# Patient Record
Sex: FEMALE | Race: WHITE | Employment: UNEMPLOYED | URBAN - METROPOLITAN AREA
[De-identification: names, ages, dates, MRNs, and addresses within clinical notes are randomized per-mention and may not be internally consistent; named-entity substitution may affect disease eponyms.]

---

## 2017-11-04 PROBLEM — F44.5 PSEUDOSEIZURE: Status: ACTIVE | Noted: 2017-11-04

## 2021-05-12 ENCOUNTER — APPOINTMENT (OUTPATIENT)
Dept: CT IMAGING | Age: 46
End: 2021-05-12
Payer: COMMERCIAL

## 2021-05-12 ENCOUNTER — APPOINTMENT (OUTPATIENT)
Dept: GENERAL RADIOLOGY | Age: 46
End: 2021-05-12
Payer: COMMERCIAL

## 2021-05-12 ENCOUNTER — HOSPITAL ENCOUNTER (EMERGENCY)
Age: 46
Discharge: HOME | End: 2021-05-12
Attending: EMERGENCY MEDICINE
Payer: COMMERCIAL

## 2021-05-12 VITALS
DIASTOLIC BLOOD PRESSURE: 76 MMHG | OXYGEN SATURATION: 98 % | RESPIRATION RATE: 20 BRPM | BODY MASS INDEX: 20.27 KG/M2 | HEART RATE: 92 BPM | WEIGHT: 126.13 LBS | TEMPERATURE: 97.1 F | HEIGHT: 66 IN | SYSTOLIC BLOOD PRESSURE: 106 MMHG

## 2021-05-12 DIAGNOSIS — S09.90XA MINOR CLOSED HEAD INJURY: ICD-10-CM

## 2021-05-12 DIAGNOSIS — T40.601A OPIATE OVERDOSE, ACCIDENTAL OR UNINTENTIONAL, INITIAL ENCOUNTER (HCC): Primary | ICD-10-CM

## 2021-05-12 PROCEDURE — 99285 EMERGENCY DEPT VISIT HI MDM: CPT

## 2021-05-12 PROCEDURE — 71045 X-RAY EXAM CHEST 1 VIEW: CPT

## 2021-05-12 PROCEDURE — 70450 CT HEAD/BRAIN W/O DYE: CPT

## 2021-05-12 PROCEDURE — 6370000000 HC RX 637 (ALT 250 FOR IP): Performed by: STUDENT IN AN ORGANIZED HEALTH CARE EDUCATION/TRAINING PROGRAM

## 2021-05-12 PROCEDURE — 72125 CT NECK SPINE W/O DYE: CPT

## 2021-05-12 PROCEDURE — 6360000002 HC RX W HCPCS: Performed by: STUDENT IN AN ORGANIZED HEALTH CARE EDUCATION/TRAINING PROGRAM

## 2021-05-12 RX ORDER — LORAZEPAM 2 MG/ML
1 INJECTION INTRAMUSCULAR ONCE
Status: DISCONTINUED | OUTPATIENT
Start: 2021-05-12 | End: 2021-05-12 | Stop reason: HOSPADM

## 2021-05-12 RX ORDER — ACETAMINOPHEN 325 MG/1
650 TABLET ORAL ONCE
Status: COMPLETED | OUTPATIENT
Start: 2021-05-12 | End: 2021-05-12

## 2021-05-12 RX ADMIN — ACETAMINOPHEN 650 MG: 325 TABLET ORAL at 07:34

## 2021-05-12 ASSESSMENT — ENCOUNTER SYMPTOMS
SHORTNESS OF BREATH: 0
DIARRHEA: 0
COUGH: 0
ABDOMINAL PAIN: 0
WHEEZING: 0
BACK PAIN: 0
NAUSEA: 0
VOMITING: 0
SORE THROAT: 0

## 2021-05-12 ASSESSMENT — PAIN SCALES - GENERAL: PAINLEVEL_OUTOF10: 10

## 2021-05-12 NOTE — ED NOTES
The patient sleeps soundly and awakens easily to verbal stimuli. She follows commands. She has no recall of the morning events.      Jon Jiménez RN  05/12/21 6222

## 2021-05-12 NOTE — ED PROVIDER NOTES
This is a 43-year-old female with history of hepatitis C, psychogenic nonepileptic seizures, presenting to the emergency department for apparent opiate overdose. Patient was found by family member in the shower unresponsive, family member did not detect pulse and started chest compressions. When EMS arrived they stated that patient was unresponsive but did have a pulse, was given 2 mg of Narcan and woke up, was agitated. On presentation emergency department patient is restless, agitated, awake and alert. Patient complaining of posterior occipital pain, it is dull, achy, constant, severe, not relieved by anything, not worsened by anything, with no associated symptoms. The history is provided by the patient, the EMS personnel and medical records. Review of Systems   Constitutional: Negative for chills and fever. HENT: Negative for congestion and sore throat. Eyes: Negative for visual disturbance. Respiratory: Negative for cough, shortness of breath and wheezing. Cardiovascular: Negative for chest pain. Gastrointestinal: Negative for abdominal pain, diarrhea, nausea and vomiting. Genitourinary: Negative for dysuria and frequency. Musculoskeletal: Negative for arthralgias, back pain and neck pain. Skin: Negative for rash and wound. Neurological: Positive for headaches. Negative for weakness and light-headedness. Hematological: Negative for adenopathy. All other systems reviewed and are negative. Physical Exam  Vitals signs and nursing note reviewed. Constitutional:       Appearance: She is not ill-appearing or diaphoretic. Comments: Laying in bed, writhing, awake, alert   HENT:      Head:      Comments: Small abrasion left zygomatic process.   Mildly tender to palpation posterior occipital region     Right Ear: External ear normal.      Left Ear: External ear normal.      Nose: Nose normal.      Mouth/Throat:      Mouth: Mucous membranes are moist.      Pharynx: Oropharynx is clear. Comments: Very poor dentition, no bleeding or lacerations  Eyes:      Extraocular Movements: Extraocular movements intact. Pupils: Pupils are equal, round, and reactive to light. Neck:      Musculoskeletal: Normal range of motion and neck supple. No neck rigidity or muscular tenderness. Cardiovascular:      Rate and Rhythm: Normal rate and regular rhythm. Pulses: Normal pulses. Heart sounds: Normal heart sounds. Pulmonary:      Effort: Pulmonary effort is normal. No respiratory distress. Breath sounds: Normal breath sounds. No wheezing. Abdominal:      General: Abdomen is flat. There is no distension. Palpations: Abdomen is soft. Tenderness: There is no abdominal tenderness. Musculoskeletal:         General: No swelling or deformity. Right lower leg: No edema. Left lower leg: No edema. Lymphadenopathy:      Cervical: No cervical adenopathy. Skin:     General: Skin is warm and dry. Capillary Refill: Capillary refill takes less than 2 seconds. Neurological:      General: No focal deficit present. Mental Status: She is alert and oriented to person, place, and time. Sensory: No sensory deficit. Comments: Moving all 4 extremities in bed, sensation intact to light touch throughout all 4 extremities   Psychiatric:         Thought Content: Thought content normal.         Judgment: Judgment normal.          Procedures     MDM  Number of Diagnoses or Management Options  Minor closed head injury  Opiate overdose, accidental or unintentional, initial encounter [facility])  Diagnosis management comments: This is a 70-year-old female with history of opiate use disorder, hepatitis C, presenting to the emergency department after apparent accidental overdose. Patient admits to using heroin, states she just got out of senior living on Friday. Patient denies any IV drug use.   Patient was found unresponsive by family member, was initially given place.    My plan: Symptomatic and supportive care. Will evaluate with CT head and C spine. Patient to be monitored. Electronically signed by Charmaine Torres DO on 5/12/21 at 7:31 AM EDT          [JS]   1130 Reevaluated, she is lying in bed, awake and conversant. Currently talking to social work. [RH]      ED Course User Index  [JS] Charmaine Torres DO  [RH] [address] DO        ED Course as of May 12 1544   Wed May 12, 2021   [id number]   ATTENDING PROVIDER ATTESTATION:     I have personally performed and/or participated in the history, exam, medical decision making, and procedures and agree with all pertinent clinical information unless otherwise noted. I have also reviewed and agree with the past medical, family and social history unless otherwise noted. I have discussed this patient in detail with the resident and provided the instruction and education regarding the evidence-based evaluation and treatment of [unfilled]  History: patient presents with concern for OD. Her daughter found her in the bathroom, unconscious with water everywhere. She had struck her head. EMS gave her narcan with positive response. My findings: Jazmin Jiménez is a 51 y.o. female whom is in mild distress. Physical exam reveals difficulty sitting still. PERRLA, EOMI, abrasion to the left cheek. No midline cervical tenderness. Heart RRR, lungs CTA, abdomen is soft and nontender. No focal neurologic deficits. She is oriented to person and place. My plan: Symptomatic and supportive care. Will evaluate with CT head and C spine. Patient to be monitored. Electronically signed by Charmaine Torres DO on 5/12/21 at 7:31 AM EDT          [JS]   1130 Reevaluated, she is lying in bed, awake and conversant. Currently talking to social work.     [RH]      ED Course User Index  [JS] Charmaine Torres DO  [RH] [address] DO --------------------------------------------- PAST HISTORY ---------------------------------------------  Past Medical History:  has a past medical history of Hepatitis C infection, Seizure ([address] and Ulcer. Past Surgical History:  has no past surgical history on file. Social History:  reports that she has been smoking. She has been smoking about 1.00 pack per day. She has never used smokeless tobacco. She reports current alcohol use. She reports current drug use. Drugs: Cocaine and Marijuana. Family History: family history includes Cancer in her father. The patients home medications have been reviewed. Allergies: Patient has no known allergies. -------------------------------------------------- RESULTS -------------------------------------------------  Labs:  No results found for this visit on 05/12/21. Radiology:  CT HEAD WO CONTRAST   Final Result   No acute intracranial abnormality. CT CERVICAL SPINE WO CONTRAST   Final Result   1. There is no acute compression fracture or subluxation of the cervical spine   2. Straightening and reversal of the normal cervical doses likely secondary   to muscular spasm. 3. Multilevel degenerative disc and degenerative joint disease. XR CHEST PORTABLE   Final Result   No acute process. ------------------------- NURSING NOTES AND VITALS REVIEWED ---------------------------  Date / Time Roomed:  5/12/2021  7:08 AM  ED Bed Assignment:  [id number]    The nursing notes within the ED encounter and vital signs as below have been reviewed.    /76   Pulse 92   Temp 97.1 °F (36.2 °C) (Temporal)   Resp 20   Ht 5' 6\" (1.676 m)   Wt 126 lb 2 oz (57.2 kg)   LMP 04/30/2021   SpO2 98%   BMI 20.36 kg/m²   Oxygen Saturation Interpretation: Normal      ------------------------------------------ PROGRESS NOTES ------------------------------------------  9:23 AM EDT  I have spoken with the patient and discussed todays results, in

## 2021-05-12 NOTE — CARE COORDINATION
Pt here today after being found by family member in the shower unresponsive in shower; apparent drug OD (accidental vs intentional ?). Pt has hx psychogenic nonepileptic seizures. Per EMS pt given 2 mg of Narcan and woke up, was agitated. Pt does have abrasion to the left cheek and R side nose. Pt last seen in ED 11/4/17 for pseudoseizure. LSW consulted to assist w/counseling services for pt. LSW spoke to [facility] and Dr. Adolph Dotson. Pt wants not detox @ this time, is medically cleared and can be d/c'd once she has counseling supports. LSW spoke to [facility] who noted she called pt's Aunt is supposed to pick pt up- her # is [phone]. LSW then spoke to pt. She noted her clothes are wet and Aid is bringing her sweat pants and sweatshirt. LSW talked w/pt about counseling referrals and provided her list. Pt wants to go to [facility]) in [place]. Pt was glad her aunt is coming to get her and somewhat surprised. Pt needs no D&R recovery resources; she has Sina's card and will call if interested. LSW then called [facility] -spoke to Delio kellogg and coordinated assessment appointment for pt 5/18 1100. Pt needs to complete via phone ([facility] to call her) or walk-in and need to notify Hieu Alegre. LSW did review w/pt and she will just complete assessment via call. PT was brought to ED lobby to wait for ride. LSW notified that pt's aunt called and she cannot come and get pt. LSW called aunt and asked what has changed. LSW informed that they now have no car. Not sure why or what has happened. Pt only lives several blocks from hospital. Aunt noted address is [address] [place] [address] Rd. Pt could possibly walk. LSW notes pt can also use transport via insurance. LSW to speak to pt about using insurance for transport and pt cannot be found in lobby. LSW checked outside doors several times and pt could not be found.

## 2021-05-12 NOTE — CARE COORDINATION
This note will not be viewable in [employer] for the following reason(s). Suspected substance abuse disorder. Peer Recovery Support Note    Name: Cyndee Shetty  Date: 5/12/2021    Chief Complaint   Patient presents with   [employer] Fall     to er via ems from home after family member heard a fall in the bathroom and found the pt on the floor unresponsive. ems delivered 2mg narcan and pt responded       Peer Support met with patient. [x] Support and education provided  [x] Resources provided   [] Treatment referral:   [] Other:  [] Patient declined peer recovery services     Referred By: Dr. Saira Sanon    Notes: Patient not agreeable to detox at this time. Patient expressed interest in counseling and/or drug treatment on an outpatient basis. Windy Chan made aware.     Signed: Obinna Bass, 5/12/2021

## 2021-05-12 NOTE — ED NOTES
Abrasions noted to the left cheek and right side nose.  No bleeding     Vern Watts RN  05/12/21 0796

## 2021-05-12 NOTE — ED NOTES
Niece Elenore Bloch) called and left telephone #.  Wants called upon d/c so she can pick her up [address] [place] [address]  05/12/21 8519

## 2021-06-06 ENCOUNTER — HOSPITAL ENCOUNTER (EMERGENCY)
Age: 46
Discharge: LEFT BEFORE DISCHARGE | End: 2021-06-06
Attending: EMERGENCY MEDICINE
Payer: COMMERCIAL

## 2021-06-06 VITALS
RESPIRATION RATE: 18 BRPM | DIASTOLIC BLOOD PRESSURE: 88 MMHG | TEMPERATURE: 98.3 F | OXYGEN SATURATION: 79 % | SYSTOLIC BLOOD PRESSURE: 143 MMHG | HEART RATE: 111 BPM

## 2021-06-06 DIAGNOSIS — F19.90 ILLICIT DRUG USE: Primary | ICD-10-CM

## 2021-06-06 DIAGNOSIS — F14.90 COCAINE USE: ICD-10-CM

## 2021-06-06 PROCEDURE — 99284 EMERGENCY DEPT VISIT MOD MDM: CPT

## 2021-06-06 ASSESSMENT — ENCOUNTER SYMPTOMS
COUGH: 0
ABDOMINAL DISTENTION: 0
BACK PAIN: 0
NAUSEA: 0
EYE REDNESS: 0
SINUS PRESSURE: 0
EYE DISCHARGE: 0
VOMITING: 0
SHORTNESS OF BREATH: 0
DIARRHEA: 0
EYE PAIN: 0
SORE THROAT: 0
WHEEZING: 0

## 2021-06-06 NOTE — ED PROVIDER NOTES
HPI   53-year-old female presents emerged department as a drug overdose. She states she does not crack cocaine is alert and orient x3 at this time. She was found by EMS to be face down on the pavement when they woke her up she was very agitated and noted to have abrasions to her face. She has no complaints of any pain at this time. She states she would like to leave at this time and is trying to find a ride to come pick her up at this time. She denies any chest pain abdominal pain changes to bowel or bladder habits, fevers chills cough notes Raynaud's denies any shortness of breath and chest pain. The patient presents with overdose that has been going on for 1 day. These symptoms are moderate in severity. Symptoms are made better by nothinng. Symptoms are made worse by nothing. Associated symptoms include none . Review of Systems   Constitutional: Negative for chills and fever. HENT: Negative for ear pain, sinus pressure and sore throat. Eyes: Negative for pain, discharge and redness. Respiratory: Negative for cough, shortness of breath and wheezing. Cardiovascular: Negative for chest pain. Gastrointestinal: Negative for abdominal distention, diarrhea, nausea and vomiting. Genitourinary: Negative for dysuria and frequency. Musculoskeletal: Negative for arthralgias and back pain. Skin: Negative for rash and wound. Neurological: Negative for weakness and headaches. Hematological: Negative for adenopathy. Psychiatric/Behavioral: Positive for agitation. All other systems reviewed and are negative. Physical Exam  Vitals and nursing note reviewed. Constitutional:       General: She is not in acute distress. Appearance: Normal appearance. She is normal weight. She is not toxic-appearing. HENT:      Head: Normocephalic. Comments: Abrasions patient patient states that she will  Eyes:      General: No scleral icterus.      Conjunctiva/sclera: Conjunctivae normal. the emergency department stage I to leave. She would be allowed to leave coldly with a sober  due to her intoxication. She was able to find a sober  here in the emergency department that sugar back to her home. She did sign out [address] at this time. ED Course as of Jun 06 1512   Sun Jun 06, 2021   1304 ATTENDING PROVIDER ATTESTATION:     I have personally performed and/or participated in the history, exam, medical decision making, and procedures and agree with all pertinent clinical information unless otherwise noted. I have also reviewed and agree with the past medical, family and social history unless otherwise noted. I have discussed this patient in detail with the resident and provided the instruction and education regarding the evidence-based evaluation and treatment of fall. History: This patient reports that she was smoking crack today when she lost her balance while stumbling and fell forward striking her face. EMS and police were summoned. Patient states she chose to come here to the hospital rather than go to intermediate. She denies any injury. She is alert, she is oriented. She is able to answer questions appropriately. My findings: Braydon Yates is a 51 y.o. female whom is in no distress. Physical exam reveals she is alert and oriented. Heart is regular and mildly tachycardic. Lungs are coarse bilaterally. Abdomen is scaphoid soft nontender peer extremities are intact no edema. Patient is alert and oriented and answers questions correctly. She seems able to make informed decision. She states that she does not want to have any examination nor testing done here today. I discussed with her her desire to scan her face to evaluate for fracture. Also to scan her head to evaluate for bleed. She refuses adamantly stating that she can call somebody come pick her up. I did discuss with her the risks involved. She does seem to understand.     My plan: Symptomatic and supportive care. She is refusing treatment and will await for a ride home. Electronically signed by Derik Avalos DO on 6/6/21 at 1:04 PM EDT          [TG]   1332 Patient declines any medical treatment and does not want evaluated, she wants to leave is try to find a ride to come and pick her up so she can safely leave the emergency department. Alert and oriented x3.    [CB]      ED Course User Index  [CB] Larna Sacks, MD  [TG] Derik Avalos DO      ED Course as of Jun 06 1512   Sun Jun 06, 2021   1304 ATTENDING PROVIDER ATTESTATION:     I have personally performed and/or participated in the history, exam, medical decision making, and procedures and agree with all pertinent clinical information unless otherwise noted. I have also reviewed and agree with the past medical, family and social history unless otherwise noted. I have discussed this patient in detail with the resident and provided the instruction and education regarding the evidence-based evaluation and treatment of fall. History: This patient reports that she was smoking crack today when she lost her balance while stumbling and fell forward striking her face. EMS and police were summoned. Patient states she chose to come here to the hospital rather than go to custodial. She denies any injury. She is alert, she is oriented. She is able to answer questions appropriately. My findings: Aditya Maldonado is a 51 y.o. female whom is in no distress. Physical exam reveals she is alert and oriented. Heart is regular and mildly tachycardic. Lungs are coarse bilaterally. Abdomen is scaphoid soft nontender peer extremities are intact no edema. Patient is alert and oriented and answers questions correctly. She seems able to make informed decision. She states that she does not want to have any examination nor testing done here today. I discussed with her her desire to scan her face to evaluate for fracture.   Also to scan her head to evaluate for bleed. She refuses adamantly stating that she can call somebody come pick her up. I did discuss with her the risks involved. She does seem to understand. My plan: Symptomatic and supportive care. She is refusing treatment and will await for a ride home. Electronically signed by Bethany Serrano DO on 6/6/21 at 1:04 PM EDT          [TG]   1332 Patient declines any medical treatment and does not want evaluated, she wants to leave is try to find a ride to come and pick her up so she can safely leave the emergency department. Alert and oriented x3.    [CB]      ED Course User Index  [CB] Kali Jacinto MD  [TG] Bethany Serrano DO       --------------------------------------------- PAST HISTORY ---------------------------------------------  Past Medical History:  has a past medical history of Hepatitis C infection, Seizure ([facility] [address] and Ulcer. Past Surgical History:  has no past surgical history on file. Social History:  reports that she has been smoking. She has been smoking about 1.00 pack per day. She has never used smokeless tobacco. She reports current alcohol use. She reports current drug use. Drugs: Cocaine and Marijuana. Family History: family history includes Cancer in her father. The patients home medications have been reviewed. Allergies: Patient has no known allergies. -------------------------------------------------- RESULTS -------------------------------------------------  Labs:  No results found for this visit on 06/06/21. Radiology:  CT HEAD WO CONTRAST    Result Date: 5/12/2021  EXAMINATION: CT OF THE HEAD WITHOUT CONTRAST  5/12/2021 8:33 am TECHNIQUE: CT of the head was performed without the administration of intravenous contrast. Dose modulation, iterative reconstruction, and/or weight based adjustment of the mA/kV was utilized to reduce the radiation dose to as low as reasonably achievable. COMPARISON: None.  HISTORY: ORDERING SYSTEM PROVIDED HISTORY: Occipital head pain, fall with head strike. Left zygomatic process abrasion. TECHNOLOGIST PROVIDED HISTORY: Reason for exam:->Occipital head pain, fall with head strike. Left zygomatic process abrasion. Has a \"code stroke\" or \"stroke alert\" been called? ->No Decision Support Exception - unselect if not a suspected or confirmed emergency medical condition->Emergency Medical Condition (MA) FINDINGS: BRAIN/VENTRICLES: There is no acute intracranial hemorrhage, mass effect or midline shift. No abnormal extra-axial fluid collection. The gray-white differentiation is maintained without evidence of an acute infarct. There is no evidence of hydrocephalus. ORBITS: The visualized portion of the orbits demonstrate no acute abnormality. SINUSES: The visualized paranasal sinuses and mastoid air cells demonstrate no acute abnormality. SOFT TISSUES/SKULL:  No acute abnormality of the visualized skull or soft tissues. No acute intracranial abnormality. CT CERVICAL SPINE WO CONTRAST    Result Date: 5/12/2021  EXAMINATION: CT OF THE CERVICAL SPINE WITHOUT CONTRAST 5/12/2021 8:33 am TECHNIQUE: CT of the cervical spine was performed without the administration of intravenous contrast. Multiplanar reformatted images are provided for review. Dose modulation, iterative reconstruction, and/or weight based adjustment of the mA/kV was utilized to reduce the radiation dose to as low as reasonably achievable. COMPARISON: None. HISTORY: ORDERING SYSTEM PROVIDED HISTORY: Fall, overdose, concern for fracture TECHNOLOGIST PROVIDED HISTORY: Reason for exam:->Fall, overdose, concern for fracture Decision Support Exception - unselect if not a suspected or confirmed emergency medical condition->Emergency Medical Condition (MA) FINDINGS: The ring of C1 is intact as is the dense. There is no compression fracture of the cervical spine. No jumped or perched facet is noted. There is straightening and reversal of the normal cervical lordosis. No jumped or perched facet is noted. Multilevel degenerative disc and degenerative joint disease is noted. The degenerative disc disease is most prominent at the C5-6 level. The prevertebral soft tissues are unremarkable. The airway is widely patent. Images through the lung apices are negative for a pneumothorax. 1. There is no acute compression fracture or subluxation of the cervical spine 2. Straightening and reversal of the normal cervical doses likely secondary to muscular spasm. 3. Multilevel degenerative disc and degenerative joint disease. XR CHEST PORTABLE    Result Date: 5/12/2021  EXAMINATION: ONE XRAY VIEW OF THE CHEST 5/12/2021 7:22 am COMPARISON: None. HISTORY: ORDERING SYSTEM PROVIDED HISTORY: overdose with chest compressions, evaluate for rib fracture TECHNOLOGIST PROVIDED HISTORY: Reason for exam:->overdose with chest compressions, evaluate for rib fracture FINDINGS: The lungs are without acute focal process. There is no effusion or pneumothorax. The cardiomediastinal silhouette is without acute process. The osseous structures are without acute process. No acute process. ------------------------- NURSING NOTES AND VITALS REVIEWED ---------------------------  Date / Time Roomed:  6/6/2021 12:42 PM  ED Bed Assignment:  [id number]    The nursing notes within the ED encounter and vital signs as below have been reviewed. BP (!) 143/88   Pulse 111   Temp 98.3 °F (36.8 °C) (Oral)   Resp 18   SpO2 (!) 79%   Oxygen Saturation Interpretation: Normal      ------------------------------------------ PROGRESS NOTES ------------------------------------------  Time: 1400  Re-evaluation. Patients symptoms show no change  Repeat physical examination is not changed. I have spoken with the patient and discussed todays availabe results to this point, in addition to providing specific details for the plan of care and counseling regarding the diagnosis and prognosis.   Their questions are answered, however they are not agreeable to admission.     --------------------------------- ADDITIONAL PROVIDER NOTES ---------------------------------  This patient has chosen to leave against medical advice. I have personally explained to them that choosing to do so may result in permanent bodily harm or death. I discussed at length that without further evaluation and monitoring there may be unforeseen circumstances and deterioration resulting in permanent bodily harm or death as a result of their choice. They are alert, oriented, and competent at this time. They state that they are aware of the serious risks as explained, but they continue to wish to leave against medical   advice. In light of their decision to leave against medical advice, follow-up has been arranged and they are aware of the importance of following up as instructed. They have been advised that they should return to the ED immediately if they change their mind at any time, or if their condition begins to change or worsen. The follow up plan has been discussed in detail and they are aware of the specific conditions for emergent return, as well as the importance of follow-up. Discharge Medication List as of 6/6/2021  2:25 PM          Diagnosis:  1. Illicit drug use    2. Cocaine use        Disposition:  Patient's disposition: Left against medical advice. Patient's condition is stable.        Duc Crane MD  Resident  06/06/21 7431

## 2021-06-06 NOTE — ED NOTES
Bed: 05  Expected date:   Expected time:   Means of arrival:   Comments:  [address], RN  06/06/21 8898

## 2021-06-06 NOTE — ED NOTES
Patient refusing all tests/ procedures at this time, + abrasions noted to bridge of nose and forehead, patient is alert and crying at this time, knows she is in hospital , who she is and time. [id number], patient states she did not want to come in to hospital to begin with. Explained that she can not leave unless she has a sober ride, patient states she does not have a ride nor her phone/ wallet with her. Physician aware, patient admits to crack and heroin. Awaiting further. Patient took herself off the monitor and refuses the oxygen at this time.       Urmila Trevizo RN  06/06/21 [address]  06/06/21 1346

## 2021-06-06 NOTE — ED NOTES
Spoke to Alexandra Liu from [facility], is aware patient is not wanting any type of help nor resources at this time, states she wants to leave.       Tika Garcia RN  06/06/21 8816

## 2021-06-06 NOTE — ED NOTES
Pt placed on 4L NC.  Pt is 96% on 4L NC     Deonte Mccall RN  06/06/21 6064 [facility] Madelaine Huertas RN  06/06/21 0449

## 2021-07-03 ENCOUNTER — HOSPITAL ENCOUNTER (EMERGENCY)
Age: 46
Discharge: HOME | End: 2021-07-03
Attending: EMERGENCY MEDICINE
Payer: COMMERCIAL

## 2021-07-03 VITALS
DIASTOLIC BLOOD PRESSURE: 78 MMHG | OXYGEN SATURATION: 97 % | HEART RATE: 78 BPM | RESPIRATION RATE: 12 BRPM | WEIGHT: 100 LBS | TEMPERATURE: 98.3 F | SYSTOLIC BLOOD PRESSURE: 114 MMHG | BODY MASS INDEX: 16.14 KG/M2

## 2021-07-03 DIAGNOSIS — F19.10 POLYSUBSTANCE ABUSE (HCC): Primary | ICD-10-CM

## 2021-07-03 PROCEDURE — 99283 EMERGENCY DEPT VISIT LOW MDM: CPT

## 2021-07-03 ASSESSMENT — ENCOUNTER SYMPTOMS
EYE DISCHARGE: 0
BACK PAIN: 0
COUGH: 0
SORE THROAT: 0
SHORTNESS OF BREATH: 0
DIARRHEA: 0
SINUS PRESSURE: 0
EYE PAIN: 0
VOMITING: 0
WHEEZING: 0
NAUSEA: 0
ABDOMINAL DISTENTION: 0
EYE REDNESS: 0

## 2021-07-03 NOTE — ED PROVIDER NOTES
Chief Complaint   Patient presents with    Drug Overdose     per [facility], states she was found \"stumbling\" out of restroom, patient admits to doing crack cocaine and heroin. patient answers questions appropriately at this time. alert and oriented x 4, patient denies use of narcan. Patient is a 52-year female presents today for polysubstance drug abuse. States she did use heroin last night. She was found stumbling around outside of a restaurant by local PD today, who brought her into the ER for evaluation. On arrival patient is alert oriented x3, no acute distress. Has no complaints at this time. Does admit using heroin last night, did not use anything today. She did not require any Narcan or reversal medication for her symptoms. She has remained alert and oriented today. She has no complaints. The history is provided by the patient. No  was used. Review of Systems   Constitutional: Negative for chills and fever. HENT: Negative for ear pain, sinus pressure and sore throat. Eyes: Negative for pain, discharge and redness. Respiratory: Negative for cough, shortness of breath and wheezing. Cardiovascular: Negative for chest pain. Gastrointestinal: Negative for abdominal distention, diarrhea, nausea and vomiting. Genitourinary: Negative for dysuria and frequency. Musculoskeletal: Negative for arthralgias and back pain. Skin: Negative for rash and wound. Neurological: Negative for dizziness, syncope, weakness and headaches. Hematological: Negative for adenopathy. Psychiatric/Behavioral: Negative for behavioral problems and confusion. All other systems reviewed and are negative. Physical Exam  Vitals and nursing note reviewed. Constitutional:       General: She is not in acute distress. Appearance: Normal appearance. She is well-developed. She is not ill-appearing, toxic-appearing or diaphoretic. HENT:      Head: Normocephalic and atraumatic. Eyes:      Pupils: Pupils are equal, round, and reactive to light. Cardiovascular:      Rate and Rhythm: Normal rate and regular rhythm. Pulses: Normal pulses. Heart sounds: Normal heart sounds. Pulmonary:      Effort: Pulmonary effort is normal. No respiratory distress. Breath sounds: Normal breath sounds. No wheezing or rales. Abdominal:      General: Bowel sounds are normal.      Palpations: Abdomen is soft. Tenderness: There is no abdominal tenderness. There is no guarding or rebound. Musculoskeletal:      Cervical back: Normal range of motion and neck supple. Skin:     General: Skin is warm and dry. Capillary Refill: Capillary refill takes less than 2 seconds. Findings: Lesion present. Comments: Multiple track marks in the upper and lower extremities bilaterally   Neurological:      General: No focal deficit present. Mental Status: She is alert and oriented to person, place, and time. Cranial Nerves: No cranial nerve deficit. Coordination: Coordination normal.      Comments: Muscle strength 5/5 in upper and lower extremities bilaterally  Sensation intact to light touch in upper and lower extremities bilaterally  Alert and oriented x3     Psychiatric:         Mood and Affect: Mood normal.          Procedures     Labs Reviewed - No data to display  No orders to display       MDM  Number of Diagnoses or Management Options  Polysubstance abuse [facility])  Diagnosis management comments: Patient is a 52-year female presents today for polysubstance drug abuse. She is a police hold. Patient did use heroin last night, has not used any other recreational drugs. Did not require any Narcan. On arrival in the department vitals are stable, she has 97% on room air. She is alert and oriented x3, no acute distress. Has no thoughts wanting to hurt herself or anyone else.   With patient remaining stable, not requiring any Narcan, having no complaints she will be discharged to local . ED Course as of Jul 03 1041   Sat Jul 03, 2021   [address]:     I have personally performed and/or participated in the history, exam, medical decision making, and procedures and agree with all pertinent clinical information unless otherwise noted. I have also reviewed and agree with the past medical, family and social history unless otherwise noted. I have discussed this patient in detail with the resident, and provided the instruction and education regarding patient here brought in by the police as a police hold for evaluation of drug use. Patient did not require any reversal agents. Patient states that she is here because the please brought her in. She has no physical complaints, she denies chest pain, palpitations or shortness of breath. She is not suicidal.  She admits to doing drugs. She has no physical complaints. She states she is ready to go. .  My findings/plan: Patient mildly fidgeting and hyperactive but otherwise in no acute distress. Heart rate regular, lungs are clear and equal.  No sign of acute head or face injury. Arms legs are neurovascular intact, moving all well with good and equal strength. No sign of acute bone or joint injury. Abdomen nontender. No jaundice or icterus. No focal neurologic deficit. Oriented x3. [NC]      ED Course User Index  [NC] Quentin Doran, DO       --------------------------------------------- PAST HISTORY ---------------------------------------------  Past Medical History:  has a past medical history of Hepatitis C infection, Seizure ([facility] [address] and Ulcer. Past Surgical History:  has no past surgical history on file. Social History:  reports that she has been smoking. She has been smoking about 1.00 pack per day. She has never used smokeless tobacco. She reports current alcohol use. She reports current drug use. Drugs: Cocaine and Marijuana.     Family History: family history includes Cancer in her father. The patients home medications have been reviewed. Allergies: Patient has no known allergies. -------------------------------------------------- RESULTS -------------------------------------------------  Labs:  No results found for this visit on 07/03/21. Radiology:  No orders to display       ------------------------- NURSING NOTES AND VITALS REVIEWED ---------------------------  Date / Time Roomed:  7/3/2021  9:57 AM  ED Bed Assignment:  08/08    The nursing notes within the ED encounter and vital signs as below have been reviewed. /78   Pulse 78   Temp 98.3 °F (36.8 °C) (Oral)   Resp 12   Wt 100 lb (45.4 kg)   SpO2 97%   BMI 16.14 kg/m²   Oxygen Saturation Interpretation: Normal      ------------------------------------------ PROGRESS NOTES ------------------------------------------  I have spoken with the patient and discussed todays results, in addition to providing specific details for the plan of care and counseling regarding the diagnosis and prognosis. Their questions are answered at this time and they are agreeable with the plan. I discussed at length with them reasons for immediate return here for re evaluation. They will followup with primary care by calling their office tomorrow. --------------------------------- ADDITIONAL PROVIDER NOTES ---------------------------------  At this time the patient is without objective evidence of an acute process requiring hospitalization or inpatient management. They have remained hemodynamically stable throughout their entire ED visit and are stable for discharge with outpatient follow-up. The plan has been discussed in detail and they are aware of the specific conditions for emergent return, as well as the importance of follow-up. New Prescriptions    No medications on file       Diagnosis:  1.  Polysubstance abuse ([facility] [address]        Disposition:  Patient's disposition: Discharge to home  Patient's condition is stable.             Pepper Garcia DO  Resident  07/03/21 8680

## 2023-11-05 PROBLEM — G43.909 MIGRAINES: Status: ACTIVE | Noted: 2023-11-05

## 2023-11-05 PROBLEM — G43.111 INTRACTABLE MIGRAINE WITH AURA WITH STATUS MIGRAINOSUS: Status: ACTIVE | Noted: 2023-11-05

## 2023-11-05 PROBLEM — H81.10 BPV (BENIGN POSITIONAL VERTIGO): Status: ACTIVE | Noted: 2023-11-05

## 2023-11-05 RX ORDER — RIZATRIPTAN BENZOATE 5 MG/1
TABLET ORAL
COMMUNITY
Start: 2022-01-14 | End: 2024-05-13 | Stop reason: SDUPTHER

## 2023-11-05 RX ORDER — BENZONATATE 100 MG/1
1 CAPSULE ORAL 3 TIMES DAILY PRN
COMMUNITY
Start: 2022-10-20 | End: 2023-11-06

## 2023-11-05 RX ORDER — FLUTICASONE PROPIONATE 50 MCG
1 SPRAY, SUSPENSION (ML) NASAL DAILY
COMMUNITY
Start: 2022-10-20 | End: 2023-11-06

## 2023-11-05 RX ORDER — BENZONATATE 200 MG/1
200 CAPSULE ORAL 3 TIMES DAILY PRN
COMMUNITY
Start: 2022-09-02 | End: 2023-11-06

## 2023-11-05 RX ORDER — CYCLOBENZAPRINE HCL 10 MG
10 TABLET ORAL EVERY 8 HOURS
COMMUNITY
Start: 2019-12-18 | End: 2023-11-06

## 2023-11-05 RX ORDER — ALBUTEROL SULFATE 90 UG/1
AEROSOL, METERED RESPIRATORY (INHALATION)
COMMUNITY
Start: 2022-09-02 | End: 2023-11-06

## 2023-11-06 ENCOUNTER — PROCEDURE VISIT (OUTPATIENT)
Dept: NEUROLOGY | Facility: CLINIC | Age: 48
End: 2023-11-06
Payer: COMMERCIAL

## 2023-11-06 DIAGNOSIS — G43.711 CHRONIC MIGRAINE WITHOUT AURA, WITH INTRACTABLE MIGRAINE, SO STATED, WITH STATUS MIGRAINOSUS: Primary | ICD-10-CM

## 2023-11-06 PROCEDURE — 64615 CHEMODENERV MUSC MIGRAINE: CPT | Performed by: STUDENT IN AN ORGANIZED HEALTH CARE EDUCATION/TRAINING PROGRAM

## 2023-11-06 ASSESSMENT — ENCOUNTER SYMPTOMS
LOSS OF SENSATION IN FEET: 0
DEPRESSION: 0
OCCASIONAL FEELINGS OF UNSTEADINESS: 0

## 2023-11-06 ASSESSMENT — PATIENT HEALTH QUESTIONNAIRE - PHQ9
1. LITTLE INTEREST OR PLEASURE IN DOING THINGS: NOT AT ALL
2. FEELING DOWN, DEPRESSED OR HOPELESS: NOT AT ALL
SUM OF ALL RESPONSES TO PHQ9 QUESTIONS 1 AND 2: 0

## 2023-11-06 NOTE — PROGRESS NOTES
Neurology Botulinum Injection    Subjective   Mar Torres is an 48 y.o. female here for medical botulinum injection for Chronic migraine without aura, with intractable migraine, so stated, with status migrainosus [G43.711].     States that headaches headaches are down to 5/30 HA days per month. Rizatriptan resolves breakthrough headaches. Denies any side effects to either medication and would like to continue current treatment plan.      Head/Face/Jaw Botulinum Injection    Date/Time: 11/6/2023 9:59 AM    Performed by: Soren Fish DO  Authorized by: Soren Fish DO      Consent:      Consent obtained:  Written     Consent given by:  Patient    Procedure details:      Toxin (Brand):  OnaBoNT-A (Botox)       Total units injected:  0     Total units wasted:  0    Comments: Procedure Note: PREEMPT Botox    Indications: Chronic Migraine    Informed consent was obtained (explaining the procedure and risks and benefits of procedure) from patient: the signed consent form was placed in the medical record.  A time out was completed, verifying correct patient, procedure,site, positioning, and implants or special equipment.    200 units of OnabotulinumtoxinA were reconstituted with saline. Sites of injection were identified via PREEMPT protocol and cleaned with alcohol pads. Using a 30 gauge needle, patient received following injections:    5 units in procerus  5 units in each left and right   10 units divided between 2 sites of L frontalis  10 units divided between 2 sites of R frontalis  20 units divided between 4 sites of L temporalis  20 units divided between 4 sites of R temporalis  15 units divided between 3 sites of L occipitalis  15 units divided between 3 sites of R occipitalis  10 units divided between 2 sites of L paracervical muscles  10 units divided between 2 sites of R paracervical muscles  15 units divided between 3 sites of L trapezius  15 units divided between 3 sites of R  trapezius    Additional Sites:  10 units divided between 2 sites of L temporalis  10 units divided between 2 sites of R temporalis    Used: 175u  Wasted: 25u    There were no complications. Patient was comfortable and left without complaint.     OnabotulinumtoxinA  Lot #: [id number]  Exp: 2/2026

## 2023-12-13 ENCOUNTER — APPOINTMENT (OUTPATIENT)
Dept: OBSTETRICS AND GYNECOLOGY | Facility: CLINIC | Age: 48
End: 2023-12-13
Payer: COMMERCIAL

## 2023-12-15 ENCOUNTER — APPOINTMENT (OUTPATIENT)
Dept: OBSTETRICS AND GYNECOLOGY | Facility: CLINIC | Age: 48
End: 2023-12-15
Payer: COMMERCIAL

## 2023-12-22 ENCOUNTER — OFFICE VISIT (OUTPATIENT)
Dept: OBGYN CLINIC | Age: 48
End: 2023-12-22
Payer: COMMERCIAL

## 2023-12-22 VITALS — HEART RATE: 59 BPM | DIASTOLIC BLOOD PRESSURE: 70 MMHG | SYSTOLIC BLOOD PRESSURE: 102 MMHG | WEIGHT: 167 LBS

## 2023-12-22 DIAGNOSIS — Z12.31 SCREENING MAMMOGRAM FOR BREAST CANCER: ICD-10-CM

## 2023-12-22 DIAGNOSIS — Z72.51 UNPROTECTED SEXUAL INTERCOURSE: ICD-10-CM

## 2023-12-22 DIAGNOSIS — Z11.51 SCREENING FOR HUMAN PAPILLOMAVIRUS: ICD-10-CM

## 2023-12-22 DIAGNOSIS — Z01.419 WOMEN'S ANNUAL ROUTINE GYNECOLOGICAL EXAMINATION: Primary | ICD-10-CM

## 2023-12-22 PROCEDURE — 99386 PREV VISIT NEW AGE 40-64: CPT | Performed by: ADVANCED PRACTICE MIDWIFE

## 2023-12-22 PROCEDURE — G8484 FLU IMMUNIZE NO ADMIN: HCPCS | Performed by: ADVANCED PRACTICE MIDWIFE

## 2023-12-23 LAB
CLUE CELLS VAG QL WET PREP: NORMAL
T VAGINALIS VAG QL WET PREP: NORMAL
TRICHOMONAS VAGINALIS SCREEN: NEGATIVE
YEAST VAG QL WET PREP: NORMAL

## 2023-12-26 NOTE — PROGRESS NOTES
Chief Complaint:     Malika Howell is a 51 y.o. female who presents here today for complaints of:      Chief Complaint   Patient presents with    New Patient    Annual Exam    Vaginal Bleeding     Bleeding during and after intercourse       History of Present Illness:     Malika Howell is a 51 y.o. female who presents for her annual exam.    Concerns Today:    None, doing well    Past Medical History: Allergies:  Penicillins  No LMP recorded. Obstetrical History:  No obstetric history on file. History reviewed. No pertinent past medical history. Medications:     Current Outpatient Medications on File Prior to Visit   Medication Sig Dispense Refill    Hydroquinone 1.5 % CREA APPLY TOPICALLY TWICE DAILY (Patient not taking: Reported on 12/22/2023) 1 Tube 0     No current facility-administered medications on file prior to visit. Review of Systems:     Review of Systems   Constitutional:  Negative for activity change, appetite change, chills, diaphoresis, fatigue, fever and unexpected weight change. HENT:  Negative for congestion, postnasal drip, rhinorrhea, sneezing, sore throat, trouble swallowing and voice change. Respiratory:  Negative for cough and shortness of breath. Cardiovascular:  Negative for chest pain. Gastrointestinal:  Negative for abdominal pain, constipation, diarrhea, nausea and vomiting. Genitourinary:  Negative for difficulty urinating, dyspareunia, dysuria, frequency, genital sores, menstrual problem, pelvic pain, vaginal bleeding, vaginal discharge and vaginal pain. Musculoskeletal:  Negative for arthralgias and myalgias. Neurological:  Negative for dizziness, syncope and headaches. Hematological:  Negative for adenopathy. All other systems reviewed and are negative. Physical Exam:     Vitals:  /70   Pulse 59   Wt 75.8 kg (167 lb)     Prior Pap History:  No prior pap results    Physical Exam  Constitutional:       General: She is not in acute distress.

## 2023-12-28 ENCOUNTER — TELEPHONE (OUTPATIENT)
Dept: OBGYN CLINIC | Age: 48
End: 2023-12-28

## 2023-12-28 NOTE — TELEPHONE ENCOUNTER
Left msg through interpretive services to call office and schedule referral to PCP to establish care.

## 2023-12-30 LAB
HPV HR 12 DNA SPEC QL NAA+PROBE: NOT DETECTED
HPV16 DNA SPEC QL NAA+PROBE: NOT DETECTED
HPV16+18+H RISK 12 DNA SPEC-IMP: NORMAL
HPV18 DNA SPEC QL NAA+PROBE: NOT DETECTED

## 2024-02-05 ENCOUNTER — APPOINTMENT (OUTPATIENT)
Dept: NEUROLOGY | Facility: CLINIC | Age: 49
End: 2024-02-05
Payer: MEDICARE

## 2024-02-12 ENCOUNTER — PROCEDURE VISIT (OUTPATIENT)
Dept: NEUROLOGY | Facility: CLINIC | Age: 49
End: 2024-02-12
Payer: MEDICARE

## 2024-02-12 DIAGNOSIS — G43.709 CHRONIC MIGRAINE W/O AURA W/O STATUS MIGRAINOSUS, NOT INTRACTABLE: Primary | ICD-10-CM

## 2024-02-12 PROCEDURE — 64615 CHEMODENERV MUSC MIGRAINE: CPT | Performed by: STUDENT IN AN ORGANIZED HEALTH CARE EDUCATION/TRAINING PROGRAM

## 2024-02-12 NOTE — PROGRESS NOTES
Neurology Botulinum Injection    Subjective   Mar Torres is an 48 y.o. female here for medical botulinum injection for Chronic migraine w/o aura w/o status migrainosus, not intractable [G43.709].     Since last Botox, having 2/30 HA days per month. Denies any side effects to Botox. Rizatriptan effective for breakthrough headaches. Would like to proceed with Botox today.    Head/Face/Jaw Botulinum Injection    Date/Time: 2/12/2024 3:22 PM    Performed by: Soren Fish DO  Authorized by: Soren Fish DO      Procedure details:        Total units injected:  0     Total units wasted:  0    Comments: Procedure Note: PREEMPT Botox    Indications: Chronic Migraine    Informed consent was obtained (explaining the procedure and risks and benefits of procedure) from patient: the signed consent form was placed in the medical record.  A time out was completed, verifying correct patient, procedure,site, positioning, and implants or special equipment.    200 units of OnabotulinumtoxinA were reconstituted with saline. Sites of injection were identified via PREEMPT protocol and cleaned with alcohol pads. Using a 30 gauge needle, patient received following injections:    5 units in procerus  5 units in each left and right   10 units divided between 2 sites of L frontalis  10 units divided between 2 sites of R frontalis  20 units divided between 4 sites of L temporalis  20 units divided between 4 sites of R temporalis  15 units divided between 3 sites of L occipitalis  15 units divided between 3 sites of R occipitalis  10 units divided between 2 sites of L paracervical muscles  10 units divided between 2 sites of R paracervical muscles  15 units divided between 3 sites of L trapezius  15 units divided between 3 sites of R trapezius    Additional Sites:  10u L temporalis  10u R temporalis    Used: 175u  Wasted: 25u    There were no complications. Patient was comfortable and left without complaint.      OnabotulinumtoxinA  Lot #: [id number]  Exp: 6/2026

## 2024-05-06 ENCOUNTER — APPOINTMENT (OUTPATIENT)
Dept: NEUROLOGY | Facility: CLINIC | Age: 49
End: 2024-05-06
Payer: MEDICARE

## 2024-05-13 ENCOUNTER — PROCEDURE VISIT (OUTPATIENT)
Dept: NEUROLOGY | Facility: CLINIC | Age: 49
End: 2024-05-13
Payer: MEDICARE

## 2024-05-13 DIAGNOSIS — G43.709 CHRONIC MIGRAINE WITHOUT AURA WITHOUT STATUS MIGRAINOSUS, NOT INTRACTABLE: Primary | ICD-10-CM

## 2024-05-13 PROCEDURE — 64615 CHEMODENERV MUSC MIGRAINE: CPT | Performed by: STUDENT IN AN ORGANIZED HEALTH CARE EDUCATION/TRAINING PROGRAM

## 2024-05-13 RX ORDER — RIZATRIPTAN BENZOATE 5 MG/1
TABLET ORAL
Qty: 9 TABLET | Refills: 11 | Status: SHIPPED | OUTPATIENT
Start: 2024-05-13

## 2024-05-13 RX ORDER — AZITHROMYCIN 250 MG/1
TABLET, FILM COATED ORAL
COMMUNITY
Start: 2024-03-19

## 2024-05-13 NOTE — PROGRESS NOTES
Neurology Botulinum Injection    Subjective   Mar Torres is an 49 y.o. female here for medical botulinum injection for Chronic migraine without aura without status migrainosus, not intractable [G43.709].     Since last Botox, having 3/30 HA days per month. Denies any side effects to Botox. Rizatriptan effective for breakthrough headaches. Will continue Botox today.     Botox    Date/Time: 5/13/2024 2:33 PM    Performed by: Soren Fish DO  Authorized by: Soren Fish DO    Procedure specific details:      Procedure Note: PREEMPT Botox    Indications: Chronic Migraine    Informed consent was obtained (explaining the procedure and risks and benefits of procedure) from patient: the signed consent form was placed in the medical record.  A time out was completed, verifying correct patient, procedure,site, positioning, and implants or special equipment.    200 units of OnabotulinumtoxinA were reconstituted with saline. Sites of injection were identified via PREEMPT protocol and cleaned with alcohol pads. Using a 30 gauge needle, patient received following injections:    5 units in procerus  5 units in each left and right   10 units divided between 2 sites of L frontalis  10 units divided between 2 sites of R frontalis  20 units divided between 4 sites of L temporalis  20 units divided between 4 sites of R temporalis  15 units divided between 3 sites of L occipitalis  15 units divided between 3 sites of R occipitalis  10 units divided between 2 sites of L paracervical muscles  10 units divided between 2 sites of R paracervical muscles  15 units divided between 3 sites of L trapezius  15 units divided between 3 sites of R trapezius    Additional Sites:  10u L temporalis  10u R temporalis    Used: 175u  Wasted: 25u    There were no complications. Patient was comfortable and left without complaint.     OnabotulinumtoxinA  Lot #: [id number]  Exp: 5/2026

## 2024-08-19 ENCOUNTER — APPOINTMENT (OUTPATIENT)
Dept: NEUROLOGY | Facility: CLINIC | Age: 49
End: 2024-08-19
Payer: MEDICARE

## 2024-08-19 DIAGNOSIS — G43.709 CHRONIC MIGRAINE WITHOUT AURA WITHOUT STATUS MIGRAINOSUS, NOT INTRACTABLE: Primary | ICD-10-CM

## 2024-08-19 NOTE — PROGRESS NOTES
Neurology Botulinum Injection    Subjective   Mar Torres is an 49 y.o. female here for medical botulinum injection for Chronic migraine without aura without status migrainosus, not intractable [G43.709].     Since last Botox, having 2/30 HA days per month. Denies any side effects to Botox. Would like to proceed with Botox today.     Botox    Date/Time: 8/19/2024 2:28 PM    Performed by: Soren Fish DO  Authorized by: Soren Fish DO    Procedure specific details:      Procedure Note: PREEMPT Botox    Indications: Chronic Migraine    Informed consent was obtained (explaining the procedure and risks and benefits of procedure) from patient: the signed consent form was placed in the medical record.  A time out was completed, verifying correct patient, procedure,site, positioning, and implants or special equipment.    200 units of OnabotulinumtoxinA were reconstituted with saline. Sites of injection were identified via PREEMPT protocol and cleaned with alcohol pads. Using a 30 gauge needle, patient received following injections:    5 units in procerus  5 units in each left and right   10 units divided between 2 sites of L frontalis  10 units divided between 2 sites of R frontalis  20 units divided between 4 sites of L temporalis  20 units divided between 4 sites of R temporalis  15 units divided between 3 sites of L occipitalis  15 units divided between 3 sites of R occipitalis  10 units divided between 2 sites of L paracervical muscles  10 units divided between 2 sites of R paracervical muscles  15 units divided between 3 sites of L trapezius  15 units divided between 3 sites of R trapezius    Additional Sites:  10u L temporalis  10u R temporalis    Used: 175u  Wasted: 25u    There were no complications. Patient was comfortable and left without complaint.     OnabotulinumtoxinA  Lot #: [id number]  Exp: 11/2026

## 2024-11-25 ENCOUNTER — APPOINTMENT (OUTPATIENT)
Dept: NEUROLOGY | Facility: CLINIC | Age: 49
End: 2024-11-25
Payer: MEDICARE

## 2024-11-25 DIAGNOSIS — G43.709 CHRONIC MIGRAINE WITHOUT AURA WITHOUT STATUS MIGRAINOSUS, NOT INTRACTABLE: Primary | ICD-10-CM

## 2024-11-25 PROCEDURE — 96372 THER/PROPH/DIAG INJ SC/IM: CPT | Performed by: STUDENT IN AN ORGANIZED HEALTH CARE EDUCATION/TRAINING PROGRAM

## 2024-11-25 PROCEDURE — 64615 CHEMODENERV MUSC MIGRAINE: CPT | Performed by: STUDENT IN AN ORGANIZED HEALTH CARE EDUCATION/TRAINING PROGRAM

## 2024-11-25 PROCEDURE — 2500000004 HC RX 250 GENERAL PHARMACY W/ HCPCS (ALT 636 FOR OP/ED): Mod: JW | Performed by: STUDENT IN AN ORGANIZED HEALTH CARE EDUCATION/TRAINING PROGRAM

## 2024-11-25 NOTE — PROGRESS NOTES
Neurology Botulinum Injection    Subjective   Mar Torres is an 49 y.o. female here for medical botulinum injection for Chronic migraine without aura without status migrainosus, not intractable [G43.709].     Since last Botox, having 8/30 HA days per month. Denies any side effects to Botox.     Botox    Date/Time: 11/25/2024 2:40 PM    Performed by: Soren Fish DO  Authorized by: Soren Fish DO    Procedure specific details:      Procedure Note: PREEMPT Botox    Indications: Chronic Migraine    Informed consent was obtained (explaining the procedure and risks and benefits of procedure) from patient: the signed consent form was placed in the medical record.  A time out was completed, verifying correct patient, procedure,site, positioning, and implants or special equipment.    200 units of OnabotulinumtoxinA were reconstituted with saline. Sites of injection were identified via PREEMPT protocol and cleaned with alcohol pads. Using a 30 gauge needle, patient received following injections:    5 units in procerus  5 units in each left and right   10 units divided between 2 sites of L frontalis  10 units divided between 2 sites of R frontalis  20 units divided between 4 sites of L temporalis  20 units divided between 4 sites of R temporalis  15 units divided between 3 sites of L occipitalis  15 units divided between 3 sites of R occipitalis  10 units divided between 2 sites of L paracervical muscles  10 units divided between 2 sites of R paracervical muscles  15 units divided between 3 sites of L trapezius  15 units divided between 3 sites of R trapezius    Additional Sites:  10u L temporalis  10u R temporalis    Used: 175u  Wasted: 25u    There were no complications. Patient was comfortable and left without complaint.     OnabotulinumtoxinA  Lot #: [id number]  Exp: 4/2027

## 2025-02-24 ENCOUNTER — APPOINTMENT (OUTPATIENT)
Dept: NEUROLOGY | Facility: CLINIC | Age: 50
End: 2025-02-24
Payer: MEDICARE

## 2025-03-14 ENCOUNTER — OFFICE VISIT (OUTPATIENT)
Dept: PRIMARY CARE | Facility: CLINIC | Age: 50
End: 2025-03-14
Payer: MEDICARE

## 2025-03-14 VITALS
WEIGHT: 158.8 LBS | DIASTOLIC BLOOD PRESSURE: 80 MMHG | SYSTOLIC BLOOD PRESSURE: 110 MMHG | RESPIRATION RATE: 20 BRPM | BODY MASS INDEX: 29.04 KG/M2 | TEMPERATURE: 98.8 F

## 2025-03-14 DIAGNOSIS — J32.9 SINOBRONCHITIS: Primary | ICD-10-CM

## 2025-03-14 DIAGNOSIS — J40 SINOBRONCHITIS: Primary | ICD-10-CM

## 2025-03-14 DIAGNOSIS — J02.9 SORE THROAT: ICD-10-CM

## 2025-03-14 LAB
POC RAPID INFLUENZA A: NEGATIVE
POC RAPID INFLUENZA B: NEGATIVE
POC RAPID STREP: NEGATIVE
POC SARS-COV-2 AG BINAX: NORMAL

## 2025-03-14 PROCEDURE — 87811 SARS-COV-2 COVID19 W/OPTIC: CPT | Performed by: NURSE PRACTITIONER

## 2025-03-14 PROCEDURE — 99213 OFFICE O/P EST LOW 20 MIN: CPT | Performed by: NURSE PRACTITIONER

## 2025-03-14 PROCEDURE — 87804 INFLUENZA ASSAY W/OPTIC: CPT | Performed by: NURSE PRACTITIONER

## 2025-03-14 PROCEDURE — 87880 STREP A ASSAY W/OPTIC: CPT | Performed by: NURSE PRACTITIONER

## 2025-03-14 RX ORDER — AZITHROMYCIN 250 MG/1
TABLET, FILM COATED ORAL
Qty: 6 TABLET | Refills: 0 | Status: SHIPPED | OUTPATIENT
Start: 2025-03-14 | End: 2025-03-19

## 2025-03-14 ASSESSMENT — ENCOUNTER SYMPTOMS
SLEEP DISTURBANCE: 1
COUGH: 1
CONSTIPATION: 0
HOARSE VOICE: 1
ACTIVITY CHANGE: 0
NAUSEA: 0
APPETITE CHANGE: 0
CHILLS: 1
VOMITING: 0
HEADACHES: 1
FEVER: 0
RHINORRHEA: 1
DIARRHEA: 0
SORE THROAT: 1
SINUS PAIN: 1

## 2025-03-14 NOTE — PROGRESS NOTES
Subjective   Patient ID: Mar Torres is a 50 y.o. female who presents for Sore Throat (Cough runny nose).    Cold symptoms x3 days  Sore throat; hurts to swallow  Cough  Headache  Hoarseness  Nasal congestion  Nasal drainage    Has been sick x1 month; never getting better    OTC- [place]/ Theraflu    Sore Throat   This is a new problem. Episode onset: 3 days. The problem has been gradually worsening. Neither side of throat is experiencing more pain than the other. There has been no fever. The pain is at a severity of 8/10. Associated symptoms include congestion, coughing, headaches and a hoarse voice. Pertinent negatives include no diarrhea, ear pain or vomiting. Associated symptoms comments: Sinus pressure  Hurts to swallow  Runny nose. Treatments tried: theraflu, halls. The treatment provided mild relief.        Review of Systems   Constitutional:  Positive for chills. Negative for activity change, appetite change and fever.   HENT:  Positive for congestion, hoarse voice, postnasal drip, rhinorrhea, sinus pain and sore throat. Negative for ear pain.    Respiratory:  Positive for cough.    Gastrointestinal:  Negative for constipation, diarrhea, nausea and vomiting.   Neurological:  Positive for headaches.   Psychiatric/Behavioral:  Positive for sleep disturbance.        Objective   /80   Temp 37.1 °C (98.8 °F) (Tympanic)   Resp 20   Wt 72 kg (158 lb 12.8 oz)   BMI 29.04 kg/m²     Physical Exam  Vitals reviewed.   Constitutional:       General: She is not in acute distress.     Appearance: Normal appearance. She is not ill-appearing or toxic-appearing.   HENT:      Head: Normocephalic.      Right Ear: Tympanic membrane, ear canal and external ear normal.      Left Ear: Tympanic membrane, ear canal and external ear normal.      Nose: Mucosal edema and rhinorrhea present. Rhinorrhea is clear.      Right Turbinates: Swollen.      Left Turbinates: Swollen.      Mouth/Throat:      Lips: Pink.      Mouth: Mucous  membranes are moist.      Pharynx: Posterior oropharyngeal erythema and postnasal drip present.   Eyes:      Extraocular Movements: Extraocular movements intact.      Conjunctiva/sclera: Conjunctivae normal.      Pupils: Pupils are equal, round, and reactive to light.   Cardiovascular:      Rate and Rhythm: Normal rate and regular rhythm.      Pulses: Normal pulses.      Heart sounds: Normal heart sounds.   Pulmonary:      Effort: Pulmonary effort is normal.      Breath sounds: Normal breath sounds.   Musculoskeletal:      Cervical back: Normal range of motion and neck supple.   Skin:     General: Skin is warm and dry.      Capillary Refill: Capillary refill takes less than 2 seconds.   Neurological:      General: No focal deficit present.      Mental Status: She is alert and oriented to person, place, and time.   Psychiatric:         Mood and Affect: Mood normal.         Behavior: Behavior normal.         Assessment/Plan   Diagnoses and all orders for this visit:  Sinobronchitis  -     azithromycin (Zithromax) 250 mg tablet; Take 2 tablets (500 mg) by mouth once daily for 1 day, THEN 1 tablet (250 mg) once daily for 4 days. Take 2 tabs (500 mg) by mouth today, than 1 daily for 4 days..  Sore throat  -     POCT BinaxNOW Covid-19 Ag Card manually resulted  -     POCT Influenza A/B manually resulted  -     POCT rapid strep A manually resulted  -     Group A Streptococcus, PCR  -     Sars-CoV-2 and Influenza A/B PCR    IO Strep/Flu/Covid negative; PCR Swabs to be sent. Will follow up on results as needed  Antibiotics started for acute sinobronchitis. Take full course until completed  Encouraged daily use of Flonase and Zyrtec for symptom support  Increase hydration  Follow up with PCP if not improving over the next 2-3 days  ER for any SOB, difficulty breathing, uncontrolled fevers or worsening of symptoms

## 2025-03-15 LAB
FLUAV RNA RESP QL NAA+PROBE: NOT DETECTED
FLUBV RNA RESP QL NAA+PROBE: NOT DETECTED
S PYO DNA THROAT QL NAA+PROBE: NOT DETECTED
SARS-COV-2 RNA RESP QL NAA+PROBE: NOT DETECTED

## 2025-06-02 ENCOUNTER — APPOINTMENT (OUTPATIENT)
Dept: NEUROLOGY | Facility: CLINIC | Age: 50
End: 2025-06-02
Payer: MEDICARE

## 2025-06-26 DIAGNOSIS — G43.709 CHRONIC MIGRAINE WITHOUT AURA WITHOUT STATUS MIGRAINOSUS, NOT INTRACTABLE: ICD-10-CM

## 2025-06-26 RX ORDER — RIZATRIPTAN BENZOATE 5 MG/1
TABLET ORAL
Qty: 9 TABLET | Refills: 11 | OUTPATIENT
Start: 2025-06-26

## 2025-07-14 ENCOUNTER — TELEPHONE (OUTPATIENT)
Dept: PHYSICAL THERAPY | Facility: CLINIC | Age: 50
End: 2025-07-14
Payer: MEDICARE

## 2025-07-16 NOTE — PROGRESS NOTES
"  Physical Therapy  Physical Therapy Evaluation    Patient Name: Mar Torres  MRN: [id number]  Today's Date: 7/18/2025  Time Calculation  Start Time: 0202  Stop Time: 0241  Time Calculation (min): 39 min  PT Evaluation Time Entry  PT Evaluation (Low) Time Entry: 29  PT Therapeutic Procedures Time Entry  Manual Therapy Time Entry: 10                   Insurance:    Visit number: 1 of 12  Authorization info: 12V 7/2/25-8/22/25  Insurance Type: [facility]     General:  Reason for visit: R shoulder/neck pain  Referred by: Sam Fernandez MD    Current Problem:  S13.4XXA- Cervical Strain   S46.911A- R shoulder Strain     Precautions: None     Medical History Form: Reviewed (scanned into chart)    Subjective:   Pt reports that she was vacuuming at work and the vacuum got stuck when she was pushing it and felt pain in shoulder. Pt stated that she gets pain that wakes her up at night and has been using biofreeze/icy hot on her shoulder at night to get some relief while she is sleeping. She noted that she will sometimes get woken up at night from the pain and have to reapply and then it takes her a while to get back to sleep. She noted that she had drTin appointment yesterday and he told her to go see PT and to check back in with her in a month. Pt has hx of migraines and is taking medication for those but the accident has flared up HA more. Pt reports that she has had some changes in vision since accident. She reports having N/T into 4-5th digits, especially at night. Pt notes that she is still working full time but supposed to be on light duty but her job is \"unorganized\" and sometimes she will have to lift heavy things and she feels pain in shoulder.     Pt notes that she was vacuuming and pushed too hard on vacuum because one of the wheels got stuck and when she pushes she feels pain/nerve stretch. - First report of injury 6/25/25    Chief Complaint: Patient presents to clinic R shoulder/ neck pain  Onset Date: 6/20/25  YARON: " "Trauma- vacuuming at work and over extended arm/pulled     Current Condition:   Same    Pain:  Pain Assessment: 0-10  0-10 (Numeric) Pain Score: 5 - Moderate pain  Pain Type: Acute pain  Pain Location: Shoulder  Pain Orientation: Right  Pain Radiating Towards: down to R elbow  Pain Descriptors: Sharp, Aching  Pain Frequency: Constant/continuous  Clinical Progression: Not changed  Location: R shoulder - pt pointed to posterior shoulder UT region  Description: Can be sharp when using it but achy the rest of the time   Aggravating Factors:  Reaching Overhead and Carrying  Relieving Factors:  Icy hot & biofreeze for temporary pain relief at night and pain medication   Wakes her up at night and puts more topical cream on and then it takes a while for her to go back to sleep.     Relevant Information (PMH & Previous Tests/Imaging): None   Previous Interventions/Treatments: None    Prior Level of Function (PLOF)  Patient previously independent with all ADLs  Exercise/Physical Activity: Walking   Work/School: Still working full time- supposed to be on light work but sometimes has to still lift heavy things.   Hobbies: play/carry with grandkids (unable to currently due to pain)      Patients Living Environment: Reviewed and no concern    Primary Language: English    Patient's Goal(s) for Therapy: \"Go back to doing normal things\"     Red Flags: Do you have any of the following? Yes- chills and Night pain and N/T in RUE   Fever/chills, unexplained weight changes, dizziness/fainting, unexplained change in bowel or bladder functions, unexplained malaise or muscle weakness, night pain/sweats, numbness or tingling    Objective:    Cervical AROM ( P! indicates pain)  Flex: 20 P!  Ext: 22  RSB: 30 P!  LSB: 22 P!  RR: 20 P!  LR: 22 P!    Cervical MMT: NT due to pain   Flex:   Ext:   RSB:   LSB:   RR:   LR:     Cervical Special Tests:   Spurlings: (+)  Compression: (+)  Distraction: increased pain    Shoulder AROM ( P! indicates " pain)-  Flexion:  R 82 P!  ABD: R 78 P!  ER: R 27 P!  IR: R HBB P!    Shoulder MMT  ( P! indicates pain)-  Flex: R 3- P!  ABD: R 3-  IR: R NT due to pain  ER: R NT due to pain      Special Tests - NT due to pain   Pedro Urias  Carlos   Empty Can   Painful Arc  Speeds  Biceps Load      Palpation: TTP all around shoulder and UT    Posture: Slight rounded shoulders and guarding noted on R side.     Outcome Measures:         Complexity Low     EDUCATION: Pt educated in HEP, PT POC, anatomy, activity avoidance, proper positioning/posture, use of Heat , pt demonstrates understanding of PT info, all questions answered.      Goals: Set and discussed today  Increase AROM to WNL of R filomena  Increase Strength by 1/3 to 1 mm grade where deficits noted of R filomena  Ind w/ HEP of R filomena  Return to PLOF of R filomena  Reduce c/o Pain to 2/10 or less of R filomena  Pt's functional goals being able to play with and  kids and return to full duty at work without pain.     Plan of care was developed with input and agreement by the patient.    Treatment Performed:    Therapeutic Exercise:    0 min  Shoulder shrugs P!  Scap retracts P!  Filomena ISO ABD P!      Manual Therapy:    10 min  Light distraction and UT stretching and very light STM to cervical paraspinals and along UT and over anterior/lateral R shoulder   PROM into shoulder flexion, ABD and ER within pain free range       Assessment: 51 y/o F presents with c/o R shoulder . Upon examination patient demonstrates moderate pain limiting overall functional mobility including turning head and lifting shoulder. Activity limitations and participations restrictions include being able to complete duties at work and caring for kids/grandkids. Pt to benefit from outpatient PT to address deficits, maximize functional mobility and improve QOL.  The clinical presentation of this patient is stable and their history and examination findings are consistent with a low complexity evaluation with  good rehab potential.  Shoulder shrugs and scapular retractions were trialed and caused pain so it was discontinued. Pt was unable to do shoulder ABD isometrics as well due to pain. Pt was able to tolerate manual therapy done today fairly with minimal discomfort when in pain free range.           Plan:     Planned Interventions include: therapeutic exercise, self-care home management, manual therapy, therapeutic activities, gait training, neuromuscular coordination, vasopneumatic, dry needling, aquatic therapy  Frequency: 2x/wk  Duration: 6 weeks

## 2025-07-18 ENCOUNTER — EVALUATION (OUTPATIENT)
Dept: PHYSICAL THERAPY | Facility: CLINIC | Age: 50
End: 2025-07-18
Payer: COMMERCIAL

## 2025-07-18 DIAGNOSIS — S13.4XXD SPRAIN OF LIGAMENTS OF CERVICAL SPINE, SUBSEQUENT ENCOUNTER: ICD-10-CM

## 2025-07-18 DIAGNOSIS — S46.911D RIGHT SHOULDER STRAIN, SUBSEQUENT ENCOUNTER: Primary | ICD-10-CM

## 2025-07-18 PROBLEM — S13.4XXA SPRAIN OF LIGAMENTS OF CERVICAL SPINE: Status: ACTIVE | Noted: 2025-07-18

## 2025-07-18 PROCEDURE — 97140 MANUAL THERAPY 1/> REGIONS: CPT | Mod: GP | Performed by: PHYSICAL THERAPIST

## 2025-07-18 PROCEDURE — 97161 PT EVAL LOW COMPLEX 20 MIN: CPT | Mod: GP | Performed by: PHYSICAL THERAPIST

## 2025-07-18 ASSESSMENT — PAIN SCALES - GENERAL: PAINLEVEL_OUTOF10: 5 - MODERATE PAIN

## 2025-07-18 ASSESSMENT — PAIN - FUNCTIONAL ASSESSMENT: PAIN_FUNCTIONAL_ASSESSMENT: 0-10

## 2025-07-18 ASSESSMENT — PAIN DESCRIPTION - DESCRIPTORS: DESCRIPTORS: SHARP;ACHING

## 2025-07-22 ENCOUNTER — TREATMENT (OUTPATIENT)
Dept: PHYSICAL THERAPY | Facility: CLINIC | Age: 50
End: 2025-07-22
Payer: COMMERCIAL

## 2025-07-22 DIAGNOSIS — S46.911D RIGHT SHOULDER STRAIN, SUBSEQUENT ENCOUNTER: Primary | ICD-10-CM

## 2025-07-22 DIAGNOSIS — S13.4XXD SPRAIN OF LIGAMENTS OF CERVICAL SPINE, SUBSEQUENT ENCOUNTER: ICD-10-CM

## 2025-07-22 PROCEDURE — 97140 MANUAL THERAPY 1/> REGIONS: CPT | Mod: GP,CQ

## 2025-07-22 PROCEDURE — 97110 THERAPEUTIC EXERCISES: CPT | Mod: GP,CQ

## 2025-07-22 ASSESSMENT — PAIN SCALES - GENERAL: PAINLEVEL_OUTOF10: 5 - MODERATE PAIN

## 2025-07-22 ASSESSMENT — PAIN DESCRIPTION - DESCRIPTORS: DESCRIPTORS: SHARP;ACHING

## 2025-07-22 ASSESSMENT — PAIN - FUNCTIONAL ASSESSMENT: PAIN_FUNCTIONAL_ASSESSMENT: 0-10

## 2025-07-22 NOTE — PROGRESS NOTES
"Physical Therapy Treatment    Patient Name: Mar Torres  MRN: [id number]  Today's Date: 7/22/2025  Time Calculation  Start Time: 0230  Stop Time: 0310  Time Calculation (min): 40 min     PT Therapeutic Procedures Time Entry  Therapeutic Exercise Time Entry: 23  Manual Therapy Time Entry: 17                 Insurance:   Visit number: 2 of 12  Authorization info: 12V 7/2/25-8/22/25  Insurance Type: [facility]      General:  Reason for visit: R shoulder/neck pain  Referred by: Sam Fernandez MD  Pt reports that she was vacuuming at work and the vacuum got stuck when she was pushing it and felt pain in shoulder.     Assessment:   Pt very guarded w/ ex's and manual performed today. She had the most pain w/ end range flex performed today. Some tightness and mod tenderness along R UT however pt did report decreased discomfort following STM to area. Progressed program w/ addition of seated table slides, UT Stretches and Levator Stretches. Pt w/ very poor posture during session. Mod cues to correct w/ fair f/t. Pt w/ fair tolerance overall to given ex's. Encouraged cont'd compliance w/ HEP.     Plan:       Current Problem  1. Right shoulder strain, subsequent encounter        2. Sprain of ligaments of cervical spine, subsequent encounter            Subjective   General   \"It's a little better.\" 5/10 currently   Precautions   None    Pain  Pain Assessment: 0-10  0-10 (Numeric) Pain Score: 5 - Moderate pain  Pain Type: Acute pain  Pain Location: Shoulder  Pain Orientation: Right, Upper  Pain Radiating Towards: down to R elbow  Pain Descriptors: Sharp, Aching  Pain Frequency: Constant/continuous  Clinical Progression: Not changed    Objective       Treatments:  Therapeutic Exercise (35359):   Shoulder shrugs P!  Scap retracts 3\" x 10   Breann ISO ABD P!  Supine Shoulder CP, ER x10 ea  Seated Table Slides Flex, Scap x10 ea  UT Stretch 30\" x 3 R only  Levator Stretch 30\" x 3 R only       Manual ([id number]): 17 min   Light distraction and UT " stretching and very light STM to cervical paraspinals and along UT and over anterior/lateral R shoulder   PROM into shoulder flexion, ABD and ER within pain free range   STM R UT   EDUCATION:

## 2025-07-24 NOTE — PROGRESS NOTES
"Physical Therapy Treatment    Patient Name: Mar Torres  MRN: [id number]  Today's Date: 7/29/2025  Time Calculation  Start Time: 0254  Stop Time: 0319  Time Calculation (min): 25 min     PT Therapeutic Procedures Time Entry  Therapeutic Exercise Time Entry: 17  Manual Therapy Time Entry: 8                 Insurance:   Visit number: 3 of 12  Authorization info: 12V 7/2/25-8/22/25  Insurance Type: [facility]      General:  Reason for visit: R shoulder/neck pain  Referred by: Sam Fernandez MD  Pt reports that she was vacuuming at work and the vacuum got stuck when she was pushing it and felt pain in shoulder.        Current Problem  1. Right shoulder strain, subsequent encounter        2. Sprain of ligaments of cervical spine              Subjective   General   Pt notes pain still subsiding and is back to work on light duty.  Precautions   None    Pain  Pain Assessment: 0-10  0-10 (Numeric) Pain Score: 4  Pain Type: Acute pain  Pain Location: Shoulder  Clinical Progression: Gradually improving    Objective   Arrived late today    Treatments:  Therapeutic Exercise (97269):  Shoulder shrugs  20x  Scap retracts 20x   Cervical ROM F/E/RR/LR/RSB/LSB 10x ea  UT Stretch 30\" x 3 BL  Supine Wand FF 20x  Levator Stretch 30\" x 3 BL  Breann ISO ABD --  Supine Shoulder CP, ER x20 ea  Seated Table Slides Flex, Scap x10 ea--         Manual ([id number]):    Light distraction and UT stretching and very light STM to cervical paraspinals and along UT and over anterior/lateral R shoulder   PROM into shoulder flexion, ABD and ER within pain free range   STM R UT     EDUCATION:     Assessment:  Pt able to tolerate tx session well this date w/ no adverse effects noted from tx.  Pt compliant w/ therapy and appears to be making gains w/ program  Still requires skilled therapy  to address functional and measurable deficits that impair pt's QOL.     Plan:  D/t noted impairments and dysfunction of  R breann and neck, PT will cont to address deficits of strength " and ROM via therapeutic exs and modalities PRN and further assist w/ pain reduction.

## 2025-07-29 ENCOUNTER — TREATMENT (OUTPATIENT)
Dept: PHYSICAL THERAPY | Facility: CLINIC | Age: 50
End: 2025-07-29
Payer: COMMERCIAL

## 2025-07-29 DIAGNOSIS — S13.4XXA SPRAIN OF LIGAMENTS OF CERVICAL SPINE: ICD-10-CM

## 2025-07-29 DIAGNOSIS — S46.911D RIGHT SHOULDER STRAIN, SUBSEQUENT ENCOUNTER: Primary | ICD-10-CM

## 2025-07-29 PROCEDURE — 97110 THERAPEUTIC EXERCISES: CPT | Mod: GP | Performed by: PHYSICAL THERAPIST

## 2025-07-29 PROCEDURE — 97140 MANUAL THERAPY 1/> REGIONS: CPT | Mod: GP | Performed by: PHYSICAL THERAPIST

## 2025-07-29 ASSESSMENT — PAIN - FUNCTIONAL ASSESSMENT: PAIN_FUNCTIONAL_ASSESSMENT: 0-10

## 2025-07-29 ASSESSMENT — PAIN SCALES - GENERAL: PAINLEVEL_OUTOF10: 4

## 2025-07-29 NOTE — PROGRESS NOTES
"Physical Therapy Treatment    Patient Name: Mar Torres  MRN: [id number]  Today's Date: 7/31/2025  Time Calculation  Start Time: 0245  Stop Time: 0324  Time Calculation (min): 39 min     PT Therapeutic Procedures Time Entry  Therapeutic Exercise Time Entry: 28  Manual Therapy Time Entry: 11                 Insurance:   Visit number: 4 of 12  Authorization info: 12V 7/2/25-8/22/25    Insurance Type: [facility]      General:  Reason for visit: R shoulder/neck pain  Referred by: Sam Fernandez MD  Pt reports that she was vacuuming at work and the vacuum got stuck when she was pushing it and felt pain in shoulder.        Current Problem  No diagnosis found.        Subjective   General   Pt notes pain still present.  Precautions   None    Pain  Pain Assessment: 0-10  0-10 (Numeric) Pain Score: 4  Pain Type: Acute pain  Pain Location: Shoulder    Objective       Treatments:  Therapeutic Exercise (01333):  Shoulder shrugs  20x  Cervical LSB Stretch 5x30\"  Scap retracts 10x10\"  Cervical ROM F/E/RR/LR/RSB/LSB 10x ea--  UT Stretch 30\" x 3 BL  Supine Wand FF/AB 20x ea  Levator Stretch 30\" x 3 BL  Breann ISO ABD --  Supine Shoulder CP, ER x20 ea  Seated Table Slides Flex, Scap x10 ea--  LAE RTB 20x  Pulley Rows RTB 20x  Pallof Press RTB 20 BL  UBE 3' F       Manual ([id number]):    Light distraction and UT stretching and very light STM to cervical paraspinals and along UT and over anterior/lateral R shoulder   PROM into shoulder flexion, ABD and ER within pain free range   STM R UT     EDUCATION:     Assessment:  No pain behavior noted during tx.  Pt able to tolerate tx session well this date w/ no adverse effects noted from tx.  Pt compliant w/ therapy and appears to be making gains w/ program  Still requires skilled therapy  to address functional and measurable deficits that impair pt's QOL.     Plan:  D/t noted impairments and dysfunction of  R breann and neck, PT will cont to address deficits of strength and ROM via therapeutic exs and " modalities PRN and further assist w/ pain reduction.

## 2025-07-31 ENCOUNTER — TREATMENT (OUTPATIENT)
Dept: PHYSICAL THERAPY | Facility: CLINIC | Age: 50
End: 2025-07-31
Payer: COMMERCIAL

## 2025-07-31 DIAGNOSIS — S46.911D RIGHT SHOULDER STRAIN, SUBSEQUENT ENCOUNTER: Primary | ICD-10-CM

## 2025-07-31 PROCEDURE — 97110 THERAPEUTIC EXERCISES: CPT | Mod: GP | Performed by: PHYSICAL THERAPIST

## 2025-07-31 PROCEDURE — 97140 MANUAL THERAPY 1/> REGIONS: CPT | Mod: GP | Performed by: PHYSICAL THERAPIST

## 2025-07-31 ASSESSMENT — PAIN SCALES - GENERAL: PAINLEVEL_OUTOF10: 4

## 2025-07-31 ASSESSMENT — PAIN - FUNCTIONAL ASSESSMENT: PAIN_FUNCTIONAL_ASSESSMENT: 0-10

## 2025-08-01 NOTE — PROGRESS NOTES
"Physical Therapy Treatment    Patient Name: Mar Torres  MRN: [id number]  Today's Date: 8/5/2025  Time Calculation  Start Time: 0255  Stop Time: 0319  Time Calculation (min): 24 min     PT Therapeutic Procedures Time Entry  Therapeutic Exercise Time Entry: 24                 Insurance:   Visit number: 5 of 12  Authorization info: 12V 7/2/25-8/22/25    Insurance Type: [facility]      General:  Reason for visit: R shoulder/neck pain  Referred by: Sam Fernandez MD  Pt reports that she was vacuuming at work and the vacuum got stuck when she was pushing it and felt pain in shoulder.        Current Problem  1. Right shoulder strain, subsequent encounter                Subjective   General   Pt notes no pain this date and things are getting better..  Precautions   None    Pain  Pain Assessment: 0-10  0-10 (Numeric) Pain Score: 0 - No pain  Pain Location: Shoulder  Pain Orientation: Right  Clinical Progression: Gradually improving    Objective   Arrived late this date    Treatments:  Therapeutic Exercise ([id number]):  Shoulder shrugs  20x--  Cervical LSB Stretch 5x30\"--  Scap retracts 10x10\"--  Cervical ROM F/E/RR/LR/RSB/LSB 10x ea--  UT Stretch 30\" x 3 BL  Supine Wand FF/AB 20x ea--  Levator Stretch 30\" x 3 BL  Breann ISO ABD --  Supine Shoulder CP, ER x20 ea--  Seated Table Slides Flex, Scap x10 ea--  LAE RTB 20x  Pulley Rows RTB 20x  Pallof Press RTB 20 BL  UBE 3' F  Stdg Wand Breann Ext 10x10\"  Stdg OH Press 2# Wand 2x10  D Rolls 20x  ER RTB 2x10       Manual ([id number]):  NT  Light distraction and UT stretching and very light STM to cervical paraspinals and along UT and over anterior/lateral R shoulder   PROM into shoulder flexion, ABD and ER within pain free range   STM R UT     EDUCATION:     Assessment:  No pain behavior noted during tx.  Pt able to tolerate tx session well this date w/ no adverse effects noted from tx.  Pt compliant w/ therapy and appears to be making gains w/ program  Still requires skilled therapy  to address " functional and measurable deficits that impair pt's QOL.     Plan:  D/t noted impairments and dysfunction of  R filomena and neck, PT will cont to address deficits of strength and ROM via therapeutic exs and modalities PRN and further assist w/ pain reduction.

## 2025-08-05 ENCOUNTER — TREATMENT (OUTPATIENT)
Dept: PHYSICAL THERAPY | Facility: CLINIC | Age: 50
End: 2025-08-05
Payer: COMMERCIAL

## 2025-08-05 DIAGNOSIS — S46.911D RIGHT SHOULDER STRAIN, SUBSEQUENT ENCOUNTER: Primary | ICD-10-CM

## 2025-08-05 PROCEDURE — 97110 THERAPEUTIC EXERCISES: CPT | Mod: GP | Performed by: PHYSICAL THERAPIST

## 2025-08-05 ASSESSMENT — PAIN SCALES - GENERAL: PAINLEVEL_OUTOF10: 0 - NO PAIN

## 2025-08-05 ASSESSMENT — PAIN - FUNCTIONAL ASSESSMENT: PAIN_FUNCTIONAL_ASSESSMENT: 0-10

## 2025-08-12 ENCOUNTER — APPOINTMENT (OUTPATIENT)
Dept: PHYSICAL THERAPY | Facility: CLINIC | Age: 50
End: 2025-08-12
Payer: COMMERCIAL

## 2025-08-14 ENCOUNTER — APPOINTMENT (OUTPATIENT)
Dept: PHYSICAL THERAPY | Facility: CLINIC | Age: 50
End: 2025-08-14
Payer: COMMERCIAL

## 2025-08-19 ENCOUNTER — APPOINTMENT (OUTPATIENT)
Dept: PHYSICAL THERAPY | Facility: CLINIC | Age: 50
End: 2025-08-19
Payer: COMMERCIAL

## 2025-08-20 ENCOUNTER — TREATMENT (OUTPATIENT)
Dept: PHYSICAL THERAPY | Facility: CLINIC | Age: 50
End: 2025-08-20
Payer: COMMERCIAL

## 2025-08-20 DIAGNOSIS — S46.911D RIGHT SHOULDER STRAIN, SUBSEQUENT ENCOUNTER: ICD-10-CM

## 2025-08-20 DIAGNOSIS — S13.4XXD SPRAIN OF LIGAMENTS OF CERVICAL SPINE, SUBSEQUENT ENCOUNTER: ICD-10-CM

## 2025-08-20 PROCEDURE — 97110 THERAPEUTIC EXERCISES: CPT | Mod: GP,CQ

## 2025-08-20 PROCEDURE — 97140 MANUAL THERAPY 1/> REGIONS: CPT | Mod: GP,CQ

## 2025-08-21 ENCOUNTER — TREATMENT (OUTPATIENT)
Dept: PHYSICAL THERAPY | Facility: CLINIC | Age: 50
End: 2025-08-21
Payer: COMMERCIAL

## 2025-08-21 DIAGNOSIS — S13.4XXD SPRAIN OF LIGAMENTS OF CERVICAL SPINE, SUBSEQUENT ENCOUNTER: ICD-10-CM

## 2025-08-21 DIAGNOSIS — S46.911D RIGHT SHOULDER STRAIN, SUBSEQUENT ENCOUNTER: ICD-10-CM

## 2025-08-21 PROCEDURE — 97140 MANUAL THERAPY 1/> REGIONS: CPT | Mod: GP

## 2025-08-21 PROCEDURE — 97110 THERAPEUTIC EXERCISES: CPT | Mod: GP

## 2025-08-28 ENCOUNTER — APPOINTMENT (OUTPATIENT)
Dept: PHYSICAL THERAPY | Facility: CLINIC | Age: 50
End: 2025-08-28
Payer: COMMERCIAL